# Patient Record
Sex: FEMALE | Race: OTHER
[De-identification: names, ages, dates, MRNs, and addresses within clinical notes are randomized per-mention and may not be internally consistent; named-entity substitution may affect disease eponyms.]

---

## 2019-11-13 ENCOUNTER — HOSPITAL ENCOUNTER (EMERGENCY)
Dept: HOSPITAL 74 - JER | Age: 34
Discharge: HOME | End: 2019-11-13
Payer: SELF-PAY

## 2019-11-13 VITALS — DIASTOLIC BLOOD PRESSURE: 68 MMHG | SYSTOLIC BLOOD PRESSURE: 105 MMHG | HEART RATE: 88 BPM | TEMPERATURE: 97.7 F

## 2019-11-13 VITALS — BODY MASS INDEX: 24.2 KG/M2

## 2019-11-13 DIAGNOSIS — O26.91: Primary | ICD-10-CM

## 2019-11-13 LAB
ALBUMIN SERPL-MCNC: 4.4 G/DL (ref 3.4–5)
ALP SERPL-CCNC: 51 U/L (ref 45–117)
ALT SERPL-CCNC: 15 U/L (ref 13–61)
ANION GAP SERPL CALC-SCNC: 6 MMOL/L (ref 8–16)
APPEARANCE UR: CLEAR
AST SERPL-CCNC: 8 U/L (ref 15–37)
BASOPHILS # BLD: 0.8 % (ref 0–2)
BILIRUB SERPL-MCNC: 0.4 MG/DL (ref 0.2–1)
BILIRUB UR STRIP.AUTO-MCNC: NEGATIVE MG/DL
BUN SERPL-MCNC: 14.1 MG/DL (ref 7–18)
CALCIUM SERPL-MCNC: 9.3 MG/DL (ref 8.5–10.1)
CHLORIDE SERPL-SCNC: 106 MMOL/L (ref 98–107)
CO2 SERPL-SCNC: 24 MMOL/L (ref 21–32)
COLOR UR: YELLOW
CREAT SERPL-MCNC: 0.7 MG/DL (ref 0.55–1.3)
DEPRECATED RDW RBC AUTO: 14.9 % (ref 11.6–15.6)
EOSINOPHIL # BLD: 1.3 % (ref 0–4.5)
GLUCOSE SERPL-MCNC: 86 MG/DL (ref 74–106)
HCT VFR BLD CALC: 38.5 % (ref 32.4–45.2)
HGB BLD-MCNC: 12.6 GM/DL (ref 10.7–15.3)
INR BLD: 1.08 (ref 0.83–1.09)
KETONES UR QL STRIP: (no result)
LEUKOCYTE ESTERASE UR QL STRIP.AUTO: NEGATIVE
LYMPHOCYTES # BLD: 35.5 % (ref 8–40)
MCH RBC QN AUTO: 29.2 PG (ref 25.7–33.7)
MCHC RBC AUTO-ENTMCNC: 32.7 G/DL (ref 32–36)
MCV RBC: 89.2 FL (ref 80–96)
MONOCYTES # BLD AUTO: 6.4 % (ref 3.8–10.2)
NEUTROPHILS # BLD: 56 % (ref 42.8–82.8)
NITRITE UR QL STRIP: NEGATIVE
PH UR: 6 [PH] (ref 5–8)
PLATELET # BLD AUTO: 322 K/MM3 (ref 134–434)
PMV BLD: 10.5 FL (ref 7.5–11.1)
POTASSIUM SERPLBLD-SCNC: 4 MMOL/L (ref 3.5–5.1)
PROT SERPL-MCNC: 7.7 G/DL (ref 6.4–8.2)
PROT UR QL STRIP: NEGATIVE
PROT UR QL STRIP: NEGATIVE
PT PNL PPP: 12.8 SEC (ref 9.7–13)
RBC # BLD AUTO: 4.31 M/MM3 (ref 3.6–5.2)
SODIUM SERPL-SCNC: 136 MMOL/L (ref 136–145)
SP GR UR: 1.02 (ref 1.01–1.03)
UROBILINOGEN UR STRIP-MCNC: 0.2 MG/DL (ref 0.2–1)
WBC # BLD AUTO: 6 K/MM3 (ref 4–10)

## 2019-11-13 NOTE — PDOC
*Physical Exam





- Vital Signs


 Last Vital Signs











Temp Pulse Resp BP Pulse Ox


 


 97.7 F   88   18   105/68   95 


 


 11/13/19 16:27  11/13/19 16:27  11/13/19 12:41  11/13/19 16:27  11/13/19 16:27














- Physical Exam


Comments: 





11/13/19 17:02


Sign-out received from outgoing ER provider Jorje.


Pt interviewed and examined.


Ancillary studies reviewed, awaiting labs. 





Patient beta 105.  





Patient likely in very early pregnancy, will need repeat beta in 48 hours and f/

u with PCP/OB.  





ED Treatment Course





- LABORATORY


CBC & Chemistry Diagram: 


 11/13/19 15:30





 11/13/19 15:30





- ADDITIONAL ORDERS


Additional order review: 


 Laboratory  Results











  11/13/19 11/13/19 11/13/19





  15:30 15:30 15:30


 


PT with INR   12.80 


 


INR   1.08 


 


Sodium    136


 


Potassium    4.0


 


Chloride    106


 


Carbon Dioxide    24


 


Anion Gap    6 L


 


BUN    14.1


 


Creatinine    0.7


 


Est GFR (CKD-EPI)AfAm    130.10


 


Est GFR (CKD-EPI)NonAf    112.25


 


Random Glucose    86


 


Calcium    9.3


 


Total Bilirubin    0.4


 


AST    8 L


 


ALT    15


 


Alkaline Phosphatase    51


 


Total Protein    7.7


 


Albumin    4.4


 


Beta HCG, Quant   


 


Blood Type  A NEGATIVE  


 


Antibody Screen  Negative  














  11/13/19





  15:30


 


PT with INR 


 


INR 


 


Sodium 


 


Potassium 


 


Chloride 


 


Carbon Dioxide 


 


Anion Gap 


 


BUN 


 


Creatinine 


 


Est GFR (CKD-EPI)AfAm 


 


Est GFR (CKD-EPI)NonAf 


 


Random Glucose 


 


Calcium 


 


Total Bilirubin 


 


AST 


 


ALT 


 


Alkaline Phosphatase 


 


Total Protein 


 


Albumin 


 


Beta HCG, Quant  105.7


 


Blood Type 


 


Antibody Screen 








 











  11/13/19





  15:30


 


RBC  4.31


 


MCV  89.2


 


MCHC  32.7


 


RDW  14.9


 


MPV  10.5


 


Neutrophils %  56.0


 


Lymphocytes %  35.5


 


Monocytes %  6.4


 


Eosinophils %  1.3


 


Basophils %  0.8














Discharge





- Discharge Information


Problems reviewed: Yes


Clinical Impression/Diagnosis: 


 Early stage of pregnancy





Condition: Stable


Disposition: HOME





- Admission


No





- Follow up/Referral


Referrals: 


Kerry Moctezuma MD [Staff Physician] - 


Lexie Mcdaniels MD [Staff Physician] - 


Dana Berry CNM [Certified Nurse Midwife] - 





- Patient Discharge Instructions


Patient Printed Discharge Instructions:  DI for Abdominal Pain -- Early 

Pregnancy


Additional Instructions: 


As discussed, you must return in 48 hours for a repeat blood test for continued 

monitoring of your pregnancy.  If you develop any vaginal bleeding, fever, 

persistent vomiting, or any new or worsening symptoms, please return to the ER.

  





- Post Discharge Activity

## 2019-11-13 NOTE — PDOC
History of Present Illness





- General


Chief Complaint: Pain


Stated Complaint: STOMACH PAIN 4 WEEKS PREGNANT


Time Seen by Provider: 11/13/19 13:13


History Source: Patient


Exam Limitations: No Limitations





Past History





- Travel


Traveled outside of the country in the last 30 days: No


Close contact w/someone who was outside of country & ill: No





- Past Medical History


Allergies/Adverse Reactions: 


 Allergies











Allergy/AdvReac Type Severity Reaction Status Date / Time


 


No Known Allergies Allergy   Verified 11/13/19 12:43











COPD: No





- Psycho Social/Smoking Cessation Hx


Smoking History: Never smoked


Information on smoking cessation initiated: No


Hx Alcohol Use: No


Drug/Substance Use Hx: No





**Review of Systems





- Review of Systems


Able to Perform ROS?: Yes


Comments:: 





11/13/19 15:35


CONSTITUTIONAL: 


Absent: fever, chills, diaphoresis, generalized weakness, malaise, loss of 

appetite


HEENT: 


Absent: rhinorrhea, nasal congestion, throat pain, throat swelling, difficulty 

swallowing, mouth swelling, ear pain, eye pain, visual Changes


CARDIOVASCULAR: 


Absent: chest pain, loss of consciousness, palpitations, irregular heart rate, 

peripheral edema


RESPIRATORY: 


Absent: cough, shortness of breath, dyspnea with exertion, orthopnea, wheezing, 

stridor, hemoptysis


GASTROINTESTINAL:


Present: Abdominal pain Absent: abdominal distension, nausea, vomiting, diarrhea

, constipation, melena, hematochezia


GENITOURINARY: 


Absent: dysuria, frequency, urgency, hesitancy, hematuria, flank pain, genital 

pain


MUSCULOSKELETAL: 


Absent: myalgia, arthralgia, joint swelling


SKIN: 


Absent: rash, itching, pallor


HEMATOLOGIC/IMMUNOLOGIC: 


Absent: easy bleeding, easy bruising, lymphadenopathy, frequent infections


ENDOCRINE:


Absent: unexplained weight gain, unexplained weight loss, heat intolerance, 

cold intolerance


NEUROLOGIC: 


Absent: headache, focal weakness or paresthesias, dizziness, unsteady gait, 

seizure, mental status changes, bladder or bowel incontinence


PSYCHIATRIC: 


Absent: anxiety, depression, suicidal or homicidal ideation, hallucinations.


Is the patient limited English proficient: No





*Physical Exam





- Vital Signs


 Last Vital Signs











Temp Pulse Resp BP Pulse Ox


 


 98.1 F   91 H  18   105/71   100 


 


 11/13/19 12:41  11/13/19 12:41  11/13/19 12:41  11/13/19 12:41  11/13/19 12:41














- Physical Exam


Comments: 





11/13/19 15:36


GENERAL:


Well developed, well nourished. Awake and alert. No acute distress.


HEENT:


Normocephalic, atraumatic. PERRLA, EOMI. No conjunctival pallor. Sclera are non-

icteric. Moist mucous membranes. Oropharynx is clear.


NECK: 


Supple. Full ROM. No JVD. Carotid pulses 2+ and symmetric, without bruits. No 

thyromegaly. No lymphadenopathy.


CARDIOVASCULAR:


Regular rate and rhythm. No murmurs, rubs, or gallops. Distal pulses are 2+ and 

symmetric. 


PULMONARY: 


No evidence of respiratory distress. Lungs clear to auscultation bilaterally. 

No wheezing, rales or rhonchi.


ABDOMINAL:


Tenderness palpation of the suprapubic area and left lower quadrant.  Soft.  Non

-distended. No rebound or guarding. No organomegaly. Normoactive bowel sounds. 


MUSCULOSKELETAL 


Normal range of motion at all joints. No bony deformities or tenderness. No CVA 

tenderness.


EXTREMITIES: 


No cyanosis. No clubbing. No edema. No calf tenderness.


SKIN: 


Warm and dry. Normal capillary refill. No rashes. No jaundice. 


NEUROLOGICAL: 


Alert, awake, appropriate. Cranial nerves 2-12 intact. No deficits to light 

touch and temperature in face, upper extremities and lower extremities. No 

motor deficits in the in face, upper extremities and lower extremities. 

Normoreflexic in the upper and lower extremities. Normal speech. Toes are down-

going bilaterally. Gait is normal without ataxia.


PSYCHIATRIC: 


Cooperative. Good eye contact. Appropriate mood and affect.





ED Treatment Course





- LABORATORY


CBC & Chemistry Diagram: 


 11/13/19 15:30





 11/13/19 15:30





Medical Decision Making





- Medical Decision Making





11/13/19 15:36


The patient is a 34-year-old female no past medical history, G1, P0, presents 

to the ER today after having a positive pregnancy test at home.  She states 

that she took the test yesterday.  Her last menstrual cycle was 10/16/19.  She 

states she has never been pregnant before.  She notes that she has some lower 

abdominal fullness and discomfort.  This started yesterday.  She is not taking 

any prenatal vitamins at this time.  Denies fevers, chills, nausea, vomiting, 

lightheadedness, dizziness.  She presents for a pregnancy evaluation.





A/P: Early pregnancy


On exam patient with tenderness to palpation over the suprapubic area.  Minimal 

discomfort over the left lower quadrant.


No vaginal bleeding


Defer pelvic as patient is not bleeding.


Basic labs including beta hCG sent


Urine ordered


Transvaginal ultrasound completed, pending results.  Suspect no fetus to be 

seen at this time as she is probably 2 to 4 weeks pregnant.  Explained this to 

the patient.


Signout given to ALFONSO Snow.  Patient is pending blood, urine and ultrasound 

results. Will need PCP and OB/GYN as she is new to the area





Discharge





- Discharge Information


Problems reviewed: Yes


Clinical Impression/Diagnosis: 


 Early stage of pregnancy





Condition: Stable


Disposition: HOME





- Follow up/Referral


Referrals: 


Lexie Mcdaniels MD [Staff Physician] - 


Dana Berry CNM [Certified Nurse Midwife] - 


Kerry Moctezuma MD [Staff Physician] - 





- Patient Discharge Instructions


Patient Printed Discharge Instructions:  DI for Abdominal Pain -- Early 

Pregnancy


Additional Instructions: 


As discussed, you must return in 48 hours for a repeat blood test for continued 

monitoring of your pregnancy.  If you develop any vaginal bleeding, fever, 

persistent vomiting, or any new or worsening symptoms, please return to the ER.

  





- Post Discharge Activity

## 2019-11-16 ENCOUNTER — HOSPITAL ENCOUNTER (EMERGENCY)
Dept: HOSPITAL 74 - JERFT | Age: 34
Discharge: HOME | End: 2019-11-16
Payer: SELF-PAY

## 2019-11-16 VITALS — HEART RATE: 96 BPM | TEMPERATURE: 98 F | DIASTOLIC BLOOD PRESSURE: 72 MMHG | SYSTOLIC BLOOD PRESSURE: 111 MMHG

## 2019-11-16 VITALS — BODY MASS INDEX: 24.2 KG/M2

## 2019-11-16 DIAGNOSIS — Z3A.01: ICD-10-CM

## 2019-11-16 DIAGNOSIS — Z32.01: Primary | ICD-10-CM

## 2019-11-16 DIAGNOSIS — O36.80X0: ICD-10-CM

## 2019-11-16 NOTE — PDOC
History of Present Illness





- General


Chief Complaint: Revisit, Lab Variance


Stated Complaint: BLOOD WORK


Time Seen by Provider: 19 12:28


History Source: Patient


Exam Limitations: No Limitations





- History of Present Illness


Initial Comments: 





19 13:42


34-year-old female denies past medical history presents for repeat beta hCG.  

Came in  for pregnancy test.  LMP 2019, beta hCG 

, result -266.  Denies vaginal bleeding, abdominal pain, pelvic pain

, urinary complaints, chest pain, shortness of breath or any other complaints.  

Patient's mother is an OB physician in [place] who instructed her to take folic 

acid 400 mg daily which patient started taking yesterday.  Patient does not 

have an OB physician at this time.





ROS:


GENERAL/CONSTITUTIONAL: No fever, chills, weakness, dizziness


HEAD, EYES, EARS, NOSE AND THROAT: No changes in vision, No ear pain or 

discharge, No sore throat


CARDIOVASCULAR: No chest pain


RESPIRATORY: No shortness of breath or cough


GASTROINTESTINAL: No pain, nausea, vomiting, diarrhea or constipation


GENITOURINARY: No dysuria


MUSCULOSKELETAL:  No neck or back pain


SKIN: No rash


NEUROLOGIC: No headache, vertigo, loss of consciousness, or loss of sensation





PE:


GENERAL: well-appearing, NAD


HEAD: NCAT


EYES: Pupils equal, round and reactive to light, sclera anicteric, conjunctiva 

clear


ENT: pharynx: no erythema, no exudate, uvula midline


NECK: supple


CHEST: nontender


RESP: clear, no w/r/r


CARDIO: rrr, no m/g/r


ABD: +BS, soft, nontender, non distended


BACK: no midline spinal ttp, no CVAT 


EXTREMITIES: Normal range of motion, no edema


NEUROLOGICAL: Normal speech, normal gait


SKIN: Warm, Dry


Is this a multiple visit Asthma Patient?: No





Past History





- Past Medical History


Allergies/Adverse Reactions: 


 Allergies











Allergy/AdvReac Type Severity Reaction Status Date / Time


 


No Known Allergies Allergy   Verified 19 12:24











Home Medications: 


Ambulatory Orders





Prenat 115/Iron Fum/Folic/Dss [Prenatal 19 Tablet] 1 each PO DAILY #90 tablet  








COPD: No





- Reproductive History


 (#): 0


Para: 1





- Psycho Social/Smoking Cessation Hx


Smoking History: Never smoked


Hx Alcohol Use: No


Drug/Substance Use Hx: No





*Physical Exam





- Vital Signs


 Last Vital Signs











Temp Pulse Resp BP Pulse Ox


 


 98 F   96 H  18   111/72   100 


 


 19 12:22  19 12:22  19 12:22  19 12:22  19 12:22














ED Treatment Course





- ADDITIONAL ORDERS


Additional order review: 


 Laboratory  Results











  19





  12:59


 


Beta HCG, Quant  266.2














Medical Decision Making





- Medical Decision Making





19 13:45


34-year-old female presents for repeat beta hCG


Beta hCG result today 266, increased from 105 on 2019


OB follow-up information provided


Patient understands she needs to call to make an OB appointment


Instructed to take daily prenatal vitamins


Return to ED if vaginal bleeding, pelvic pain, abdominal pain, chest pain, 

urinary symptoms or any complaint.





Discharge





- Discharge Information


Problems reviewed: Yes


Clinical Impression/Diagnosis: 


Pregnancy


Qualifiers:


 Weeks of gestation: less than 8 weeks Qualified Code(s): Z3A.01 - Less than 8 

weeks gestation of pregnancy





Condition: Stable


Disposition: HOME





- Follow up/Referral





- Patient Discharge Instructions


Additional Instructions: 


Take prenatal vitamin 1 tablet daily


Call Dr. Saez (OB physician)[phone] on  to schedule a 

follow-up appointment


Return to ED if pelvic pain, abdominal cramping, vaginal bleeding, back pain or 

urinary complaints





- Post Discharge Activity